# Patient Record
Sex: FEMALE | Race: WHITE | URBAN - METROPOLITAN AREA
[De-identification: names, ages, dates, MRNs, and addresses within clinical notes are randomized per-mention and may not be internally consistent; named-entity substitution may affect disease eponyms.]

---

## 2018-12-31 ENCOUNTER — OFFICE VISIT (OUTPATIENT)
Dept: ENDOCRINOLOGY | Age: 43
End: 2018-12-31
Payer: COMMERCIAL

## 2018-12-31 VITALS
HEART RATE: 76 BPM | SYSTOLIC BLOOD PRESSURE: 128 MMHG | RESPIRATION RATE: 16 BRPM | WEIGHT: 167 LBS | HEIGHT: 61 IN | BODY MASS INDEX: 31.53 KG/M2 | DIASTOLIC BLOOD PRESSURE: 74 MMHG | OXYGEN SATURATION: 98 %

## 2018-12-31 DIAGNOSIS — I10 ESSENTIAL HYPERTENSION: ICD-10-CM

## 2018-12-31 DIAGNOSIS — E04.2 MULTINODULAR GOITER: Primary | ICD-10-CM

## 2018-12-31 PROCEDURE — 99213 OFFICE O/P EST LOW 20 MIN: CPT | Performed by: INTERNAL MEDICINE

## 2018-12-31 RX ORDER — LISINOPRIL 30 MG/1
TABLET ORAL DAILY
Refills: 0 | COMMUNITY
Start: 2018-12-28

## 2018-12-31 RX ORDER — SPIRONOLACTONE 25 MG/1
12.5 TABLET ORAL DAILY
Refills: 0 | COMMUNITY
Start: 2018-12-28

## 2019-01-03 ENCOUNTER — TELEPHONE (OUTPATIENT)
Dept: ENDOCRINOLOGY | Age: 44
End: 2019-01-03

## 2019-01-13 PROBLEM — E04.2 MULTINODULAR GOITER: Status: ACTIVE | Noted: 2019-01-13

## 2019-01-13 PROBLEM — I10 ESSENTIAL HYPERTENSION: Status: ACTIVE | Noted: 2019-01-13

## 2020-04-08 ENCOUNTER — VIRTUAL VISIT (OUTPATIENT)
Dept: ENDOCRINOLOGY | Age: 45
End: 2020-04-08
Payer: COMMERCIAL

## 2020-04-08 PROBLEM — E55.9 VITAMIN D DEFICIENCY: Status: ACTIVE | Noted: 2020-04-08

## 2020-04-08 PROCEDURE — 99214 OFFICE O/P EST MOD 30 MIN: CPT | Performed by: INTERNAL MEDICINE

## 2020-08-02 ENCOUNTER — TELEPHONE (OUTPATIENT)
Dept: ENDOCRINOLOGY | Age: 45
End: 2020-08-02

## 2021-10-05 ENCOUNTER — VIRTUAL VISIT (OUTPATIENT)
Dept: ENDOCRINOLOGY | Age: 46
End: 2021-10-05
Payer: COMMERCIAL

## 2021-10-05 DIAGNOSIS — E04.2 MULTINODULAR GOITER: Primary | ICD-10-CM

## 2021-10-05 DIAGNOSIS — E55.9 VITAMIN D DEFICIENCY: ICD-10-CM

## 2021-10-05 PROCEDURE — 99214 OFFICE O/P EST MOD 30 MIN: CPT | Performed by: INTERNAL MEDICINE

## 2021-10-05 NOTE — PROGRESS NOTES
[address] [place] [address] Department of Endocrinology Diabetes and Metabolism   [address] [facility] [address]   Phone: [phone]  Fax: [phone]    Date of Service: 10/5/2021  Primary Care Physician: Halima Barcenas DO. Provider: Rafael Victoria MD  Other provider(s):            Reason for the visit:  Multinodular goiter     History of Present Illness: The history is provided by the patient. No  was used. Accuracy of the patient data is excellent. Lora Brown is a 51y.o. year old female seen today for follow up on and multinodular goiter     Patient found to have thyroid nodule on a routine physical examination many years ago     Recent Thyroid US 10/2016, i reviewed images myself   Rt lobe measures 4.6cm, isthmus measures 4.7 mm, Lt lobe measures 4.5 cm. Rt lobe à 1.2 cm round isoechoic nodule in the mid pole. Isthmus à No nodules. Lt lobe à 1.2 cm round isoechoic nodule in the lateral right lobe. Thyroid US 12/9/2017  Rt lobe measures 4.7 cm à 1 cm heterogeneous to slightly hypoechoic nodule   Lt lobe measures 5.0 à 1 cm heterogeneous to slightly hypoechoic nodule   Isthmus measures 2 mm à no nodules   Normal-appearing lymph nodes are seen in each anterior chain. Thyroid US 12/18/2018:  Right lobe:  4.7 x 1.6 x 1.3 cm, Left lobe:  5.3 x 1.5 x 1.4 cm; minimal increase in size., Isthmus:  2 mm  There are bilateral nodules. Interpolar nodule on the right is mixed  echogenic and measures 11 x 7 x 6 mm similar to the previous study. Nodule on the left is hypoechoic and measures 7 x 5 x 6 mm slightly  decreased from the previous study.  There are no focal  calcifications.  Vascularity of the thyroid parenchyma is normal    US 7/2020 --> stable MNG     April KSENIA Woodson denies any new lumps, bumps in her neck, change in her voice, or shortness of breath. No family history of thyroid cancer. No prior history of radiation to head or neck region.     9/3/2021 --> TSH 1.15, FT4 0.83     Patient denied any history of swelling in the area of the thyroid gland, weight loss, change in appetite, nervousness, anxiety, irritability, tremor, sweating, heat intolerance, changes in bowel habits, muscle weakness or difficulty sleeping. Patient also denied any h/o unexplained weight gain, new fatigue, increased sensitivity to cold, dry skin, brittle fingernails, brittle hair, facial swelling/puffiness, or depressed mood. PAST MEDICAL HISTORY   Past Medical History:   Diagnosis Date    B12 deficiency     HTN (hypertension)     Multinodular goiter     Vitamin D deficiency      PAST SURGICAL HISTORY   Past Surgical History:   Procedure Laterality Date    APPENDECTOMY      CARPAL TUNNEL RELEASE      CERVICAL DISC SURGERY      CHOLECYSTECTOMY      TONSILLECTOMY       SOCIAL HISTORY   Tobacco:   reports that she has never smoked. She has never used smokeless tobacco.  Alcol:   reports current alcohol use. Illicit Drugs:   reports no history of drug use. FAMILY HISTORY   Family History   Problem Relation Age of Onset    Cancer Mother         Slivary gland     Heart Disease Father     Colon Cancer Father      ALLERGIES AND DRUG REACTIONS   Allergies   Allergen Reactions    Codeine      Other reaction(s): Chest Pain       CURRENT MEDICATIONS     Current Outpatient Medications   Medication Sig Dispense Refill    Cyanocobalamin 1000 MCG/ML KIT Inject 1,000 mcg into the muscle      lisinopril (PRINIVIL;ZESTRIL) 30 MG tablet daily  0    Multiple Vitamins-Minerals (MULTIVITAMIN ADULT PO) Take by mouth daily      spironolactone (ALDACTONE) 25 MG tablet 12.5 tablets daily  0     No current facility-administered medications for this visit. Review of Systems  Constitutional: No fever, no chills, no diaphoresis, no generalized weakness.   HEENT: No blurred vision, No sore throat, no ear pain, no hair loss  Neck: denied any neck swelling, difficulty swallowing, Cadrdiopulomary: No CP, SOB or palpitation, No orthopnea or PND. No cough or wheezing. GI: No N/V/D, no constipation, No abdominal pain, no melena or hematochezia   : Denied any dysuria, hematuria, flank pain, discharge, or incontinence. Skin: denied any rash, ulcer, Hirsute, or hyperpigmentation. MSK: denied any joint deformity, joint pain/swelling, muscle pain, or back pain. Neuro: no numbess, no tingling, no weakness,     OBJECTIVE    There were no vitals taken for this visit. BP Readings from Last 4 Encounters:   12/31/18 128/74     Wt Readings from Last 6 Encounters:   12/31/18 167 lb (75.8 kg)       Physical examination:  General: awake alert, oriented x3, no abnormal position or movements. HEENT: normocephalic non traumatic  Neck: supple, no LN enlargement, mild thyromegaly, Thyroid nodules small but palpable, no thyroid tenderness, no JVD. Pulm: Clear equal air entry no added sounds, no wheezing or rhonchi    CVS: S1 + S2, no murmur, no heave. Dorsalis pedis pulse palpable   Abd: soft lax, no tenderness, no organomegaly, audible bowel sounds. Skin: warm, no lesions, no rash.  No Palmar erythema    Neuro: CN intact, sensation normal , muscle power normal. No tremors   Psych: normal mood, and affect    Review of Laboratory Data:  I have reviewed the following:    VITAMIN D 25-OH SCREEN FOR DEFICIENCY/FNTUIJJF14/12/2018  [facility])  Component Name Value Ref Range   Vitamin D, 25-Hydroxy 47.9      COMP METABOLIC [id number]  [facility])  Component Name Value Ref Range   Glucose 82 74 - 100 mg/dL   Urea Nitrogen 18 7 - 18 mg/dL   Creatinine 0.70 0.55 - 1.30 mg/dL   BUN/CREATININE RATIO 26     Total Bilirubin 0.5 0.2 - 1.0 mg/dL   Total Protein 8.3 (H) 6.4 - 8.2 g/dL   Albumin 4.0 3.4 - 5.0 g/dL   A/G RATIO 0.9 ratio   Calcium(Ca) 9.3 8.5 - 10.1 mg/dL   Alkaline Phosphatase 84 45 - 117 U/L   Aspartate Aminot.(AST) 14 (L) 15 - 37 U/L   Alanine Aminotrans(ALT) 25 13 - 61 U/L   Sodium(Na) 137 136 - 145 mEq/L   Potassium(K) 3.8 3.5 - 5.1 mEq/L   Chloride(Cl) 102 98 - 107 mmoL/L   Carbon Dioxide(CO2) 26.0 21 - 32 mmoL/L   ANION GAP 12.8 5 - 15 mEq/L   CALCULATED OSMOLALITY 285 270 - 295 mOs/kg   EGFR NON  105          LIPID BATTERY7/10/2018  Component Name Value Ref Range   Cholesterol 206 (H) 0 - 200 mg/dL   Triglyceride 133 0 - 149 mg/dL   High Density Lipoprotein(HDL) 74 (H) 40 - 59 mg/dL   CHOLESTEROL/HDL RATIO 2.8   Comment:  Relative Coronary  Heart Disease Risk       Male     Female    1/2 Average             3.4        3.3    Average                 5.0        4.4     2X Average            10.0        7.0     3X Average            24.0       11.0 ratio   Very Low Density Lipoprotein 27 5 - 40 mg/dL   Low Density Lipoprotein 105          No results found for: WBC, RBC, HGB, HCT, MCV, MCH, MCHC, RDW, PLT, MPV, GRANULOCYTES, BANDS   No results found for: NA, K, CO2, BUN, CALCIUM, GFR   No results found for: TSH, T4FREE, E5TWNVQ, FT3, C3LRORA, TSI, TPOABS, THGAB  No results found for: PTH  No results found for: VITD25    ASSESSMENT & RECOMMENDATIONS   April K Shoshana Woodson, a 51 y.o.-old female seen in for the following issues    Multinodular goiter  · Prevalence of thyroid nodule on thyroid ultrasound is 50% and 95 % of these nodules are benign. · Last thyroid US 7/2020 -> nodules remained stable   · Given the nodule size and lack of ultrasound suspicious features which making the nodule less likely to be malignant, I will continue following with periodic US  · Next US 8/in a year   · Thyroid function test in 9/2021     VitD deficiency  · Continue daily vitD supplements     HTN  · Continue Lisinopril and Spironolactone     I personally reviewed external notes from PCP and other patient's care team providers, and personally interpreted labs associated with the above diagnosis.  I also ordered labs to further assess and manage the above addressed medical conditions    Return in about 1 year (around 10/5/2022) for Multinodular goiter, VitD deficiency. The above issues were reviewed with the patient who understood and agreed with the plan. Thank you for allowing us to participate in the care of this patient. Please do not hesitate to contact us with any additional questions. Diagnosis Orders   1. Multinodular goiter  TSH without Reflex    T4, Free    US THYROID   2.  Vitamin D deficiency  Vitamin D 25 Hydroxy    Basic Metabolic Panel       Whitley Mccoy MD  Endocrinologist, [address]   [address] [facility], [address] [place] [address],[facility] [id number]   Phone: [phone]  Fax: [phone]

## 2022-12-12 ENCOUNTER — TELEPHONE (OUTPATIENT)
Dept: ADMINISTRATIVE | Age: 47
End: 2022-12-12

## 2022-12-12 DIAGNOSIS — E55.9 VITAMIN D DEFICIENCY: Primary | ICD-10-CM

## 2022-12-12 DIAGNOSIS — E04.2 MULTINODULAR GOITER: ICD-10-CM

## 2022-12-12 NOTE — TELEPHONE ENCOUNTER
Pt called and stated she needs a lab order in. Her order has , She would like to speak to the office before you send an order. Please contact pt.

## 2022-12-16 NOTE — TELEPHONE ENCOUNTER
Called pt back. No answer. Lm for pt to call back as she wanted to speak to us before we sent new lab orders.